# Patient Record
Sex: FEMALE | Race: WHITE | NOT HISPANIC OR LATINO | Employment: UNEMPLOYED | URBAN - METROPOLITAN AREA
[De-identification: names, ages, dates, MRNs, and addresses within clinical notes are randomized per-mention and may not be internally consistent; named-entity substitution may affect disease eponyms.]

---

## 2017-05-27 ENCOUNTER — APPOINTMENT (OUTPATIENT)
Dept: RADIOLOGY | Facility: MEDICAL CENTER | Age: 47
End: 2017-05-27
Attending: EMERGENCY MEDICINE
Payer: COMMERCIAL

## 2017-05-27 ENCOUNTER — HOSPITAL ENCOUNTER (EMERGENCY)
Facility: MEDICAL CENTER | Age: 47
End: 2017-05-27
Attending: EMERGENCY MEDICINE
Payer: COMMERCIAL

## 2017-05-27 VITALS
HEIGHT: 62 IN | OXYGEN SATURATION: 93 % | HEART RATE: 87 BPM | SYSTOLIC BLOOD PRESSURE: 108 MMHG | DIASTOLIC BLOOD PRESSURE: 75 MMHG | TEMPERATURE: 97.9 F | WEIGHT: 209.44 LBS | RESPIRATION RATE: 18 BRPM | BODY MASS INDEX: 38.54 KG/M2

## 2017-05-27 DIAGNOSIS — R10.84 GENERALIZED ABDOMINAL PAIN: ICD-10-CM

## 2017-05-27 DIAGNOSIS — G89.4 CHRONIC PAIN SYNDROME: ICD-10-CM

## 2017-05-27 LAB
ALBUMIN SERPL BCP-MCNC: 4.2 G/DL (ref 3.2–4.9)
ALBUMIN/GLOB SERPL: 1.2 G/DL
ALP SERPL-CCNC: 109 U/L (ref 30–99)
ALT SERPL-CCNC: 59 U/L (ref 2–50)
ANION GAP SERPL CALC-SCNC: 9 MMOL/L (ref 0–11.9)
APPEARANCE UR: ABNORMAL
AST SERPL-CCNC: 63 U/L (ref 12–45)
BACTERIA #/AREA URNS HPF: ABNORMAL /HPF
BASOPHILS # BLD AUTO: 0.7 % (ref 0–1.8)
BASOPHILS # BLD: 0.06 K/UL (ref 0–0.12)
BILIRUB SERPL-MCNC: 0.6 MG/DL (ref 0.1–1.5)
BILIRUB UR QL STRIP.AUTO: NEGATIVE
BUN SERPL-MCNC: 21 MG/DL (ref 8–22)
CALCIUM SERPL-MCNC: 9.7 MG/DL (ref 8.4–10.2)
CHLORIDE SERPL-SCNC: 102 MMOL/L (ref 96–112)
CO2 SERPL-SCNC: 25 MMOL/L (ref 20–33)
COLOR UR: YELLOW
CREAT SERPL-MCNC: 0.92 MG/DL (ref 0.5–1.4)
CULTURE IF INDICATED INDCX: YES UA CULTURE
EOSINOPHIL # BLD AUTO: 0.28 K/UL (ref 0–0.51)
EOSINOPHIL NFR BLD: 3.3 % (ref 0–6.9)
EPI CELLS #/AREA URNS HPF: ABNORMAL /HPF
ERYTHROCYTE [DISTWIDTH] IN BLOOD BY AUTOMATED COUNT: 47.5 FL (ref 35.9–50)
GFR SERPL CREATININE-BSD FRML MDRD: >60 ML/MIN/1.73 M 2
GLOBULIN SER CALC-MCNC: 3.5 G/DL (ref 1.9–3.5)
GLUCOSE SERPL-MCNC: 134 MG/DL (ref 65–99)
GLUCOSE UR STRIP.AUTO-MCNC: NEGATIVE MG/DL
HCG SERPL QL: NEGATIVE
HCT VFR BLD AUTO: 43.8 % (ref 37–47)
HGB BLD-MCNC: 14.1 G/DL (ref 12–16)
IMM GRANULOCYTES # BLD AUTO: 0.06 K/UL (ref 0–0.11)
IMM GRANULOCYTES NFR BLD AUTO: 0.7 % (ref 0–0.9)
KETONES UR STRIP.AUTO-MCNC: NEGATIVE MG/DL
LEUKOCYTE ESTERASE UR QL STRIP.AUTO: NEGATIVE
LIPASE SERPL-CCNC: 35 U/L (ref 7–58)
LYMPHOCYTES # BLD AUTO: 2.79 K/UL (ref 1–4.8)
LYMPHOCYTES NFR BLD: 33 % (ref 22–41)
MCH RBC QN AUTO: 29 PG (ref 27–33)
MCHC RBC AUTO-ENTMCNC: 32.2 G/DL (ref 33.6–35)
MCV RBC AUTO: 90.1 FL (ref 81.4–97.8)
MICRO URNS: ABNORMAL
MONOCYTES # BLD AUTO: 0.37 K/UL (ref 0–0.85)
MONOCYTES NFR BLD AUTO: 4.4 % (ref 0–13.4)
MUCOUS THREADS #/AREA URNS HPF: ABNORMAL /HPF
NEUTROPHILS # BLD AUTO: 4.89 K/UL (ref 2–7.15)
NEUTROPHILS NFR BLD: 57.9 % (ref 44–72)
NITRITE UR QL STRIP.AUTO: NEGATIVE
NRBC # BLD AUTO: 0 K/UL
NRBC BLD AUTO-RTO: 0 /100 WBC
PH UR STRIP.AUTO: 6 [PH]
PLATELET # BLD AUTO: 214 K/UL (ref 164–446)
PMV BLD AUTO: 9.4 FL (ref 9–12.9)
POTASSIUM SERPL-SCNC: 4.5 MMOL/L (ref 3.6–5.5)
PROT SERPL-MCNC: 7.7 G/DL (ref 6–8.2)
PROT UR QL STRIP: NEGATIVE MG/DL
RBC # BLD AUTO: 4.86 M/UL (ref 4.2–5.4)
RBC # URNS HPF: ABNORMAL /HPF
RBC UR QL AUTO: NEGATIVE
SODIUM SERPL-SCNC: 136 MMOL/L (ref 135–145)
SP GR UR STRIP.AUTO: 1.02
TROPONIN I SERPL-MCNC: <0.02 NG/ML (ref 0–0.04)
WBC # BLD AUTO: 8.5 K/UL (ref 4.8–10.8)
WBC #/AREA URNS HPF: ABNORMAL /HPF

## 2017-05-27 PROCEDURE — 76705 ECHO EXAM OF ABDOMEN: CPT

## 2017-05-27 PROCEDURE — 85025 COMPLETE CBC W/AUTO DIFF WBC: CPT

## 2017-05-27 PROCEDURE — 83690 ASSAY OF LIPASE: CPT

## 2017-05-27 PROCEDURE — 93005 ELECTROCARDIOGRAM TRACING: CPT | Performed by: EMERGENCY MEDICINE

## 2017-05-27 PROCEDURE — 96374 THER/PROPH/DIAG INJ IV PUSH: CPT

## 2017-05-27 PROCEDURE — 87086 URINE CULTURE/COLONY COUNT: CPT

## 2017-05-27 PROCEDURE — A9270 NON-COVERED ITEM OR SERVICE: HCPCS | Performed by: EMERGENCY MEDICINE

## 2017-05-27 PROCEDURE — 700111 HCHG RX REV CODE 636 W/ 250 OVERRIDE (IP): Performed by: EMERGENCY MEDICINE

## 2017-05-27 PROCEDURE — 36415 COLL VENOUS BLD VENIPUNCTURE: CPT

## 2017-05-27 PROCEDURE — 700105 HCHG RX REV CODE 258: Performed by: EMERGENCY MEDICINE

## 2017-05-27 PROCEDURE — 96376 TX/PRO/DX INJ SAME DRUG ADON: CPT

## 2017-05-27 PROCEDURE — 80053 COMPREHEN METABOLIC PANEL: CPT

## 2017-05-27 PROCEDURE — 84484 ASSAY OF TROPONIN QUANT: CPT

## 2017-05-27 PROCEDURE — 700117 HCHG RX CONTRAST REV CODE 255: Performed by: EMERGENCY MEDICINE

## 2017-05-27 PROCEDURE — 81001 URINALYSIS AUTO W/SCOPE: CPT

## 2017-05-27 PROCEDURE — 99285 EMERGENCY DEPT VISIT HI MDM: CPT

## 2017-05-27 PROCEDURE — 74177 CT ABD & PELVIS W/CONTRAST: CPT

## 2017-05-27 PROCEDURE — 700102 HCHG RX REV CODE 250 W/ 637 OVERRIDE(OP): Performed by: EMERGENCY MEDICINE

## 2017-05-27 PROCEDURE — 84703 CHORIONIC GONADOTROPIN ASSAY: CPT

## 2017-05-27 RX ORDER — SODIUM CHLORIDE 9 MG/ML
1000 INJECTION, SOLUTION INTRAVENOUS ONCE
Status: COMPLETED | OUTPATIENT
Start: 2017-05-27 | End: 2017-05-27

## 2017-05-27 RX ORDER — SERTRALINE HYDROCHLORIDE 25 MG/1
25 TABLET, FILM COATED ORAL EVERY EVENING
COMMUNITY
Start: 2018-04-29 | End: 2018-05-08

## 2017-05-27 RX ORDER — METOPROLOL TARTRATE 50 MG/1
50 TABLET, FILM COATED ORAL DAILY
COMMUNITY

## 2017-05-27 RX ORDER — ESCITALOPRAM OXALATE 10 MG/1
10 TABLET ORAL DAILY
COMMUNITY
Start: 2018-05-01 | End: 2018-05-08

## 2017-05-27 RX ORDER — SUCRALFATE 1 G/1
1 TABLET ORAL
Qty: 28 TAB | Refills: 0 | Status: SHIPPED | OUTPATIENT
Start: 2017-05-27 | End: 2017-06-03

## 2017-05-27 RX ORDER — OMEPRAZOLE 20 MG/1
20 CAPSULE, DELAYED RELEASE ORAL DAILY
Qty: 28 CAP | Refills: 0 | Status: SHIPPED | OUTPATIENT
Start: 2017-05-27 | End: 2017-06-10

## 2017-05-27 RX ORDER — DILTIAZEM HYDROCHLORIDE 120 MG/1
120 CAPSULE, COATED, EXTENDED RELEASE ORAL 2 TIMES DAILY
COMMUNITY

## 2017-05-27 RX ORDER — ATORVASTATIN CALCIUM 20 MG/1
20 TABLET, FILM COATED ORAL NIGHTLY
COMMUNITY

## 2017-05-27 RX ORDER — OXYCODONE HYDROCHLORIDE 10 MG/1
10 TABLET ORAL EVERY 4 HOURS PRN
COMMUNITY

## 2017-05-27 RX ORDER — OMEPRAZOLE 20 MG/1
20 CAPSULE, DELAYED RELEASE ORAL ONCE
Status: COMPLETED | OUTPATIENT
Start: 2017-05-27 | End: 2017-05-27

## 2017-05-27 RX ADMIN — IOHEXOL 100 ML: 350 INJECTION, SOLUTION INTRAVENOUS at 14:27

## 2017-05-27 RX ADMIN — HYDROMORPHONE HYDROCHLORIDE 0.5 MG: 1 INJECTION, SOLUTION INTRAMUSCULAR; INTRAVENOUS; SUBCUTANEOUS at 11:36

## 2017-05-27 RX ADMIN — LIDOCAINE HYDROCHLORIDE 30 ML: 20 SOLUTION OROPHARYNGEAL at 12:53

## 2017-05-27 RX ADMIN — SODIUM CHLORIDE 1000 ML: 9 INJECTION, SOLUTION INTRAVENOUS at 13:36

## 2017-05-27 RX ADMIN — HYDROMORPHONE HYDROCHLORIDE 0.5 MG: 1 INJECTION, SOLUTION INTRAMUSCULAR; INTRAVENOUS; SUBCUTANEOUS at 13:37

## 2017-05-27 RX ADMIN — OMEPRAZOLE 20 MG: 20 CAPSULE, DELAYED RELEASE ORAL at 15:24

## 2017-05-27 ASSESSMENT — PAIN SCALES - GENERAL: PAINLEVEL_OUTOF10: 8

## 2017-05-27 NOTE — ED AVS SNAPSHOT
Home Care Instructions                                                                                                                Marianne Miguel   MRN: [id number]    Department:  [facility], Emergency Dept   Date of Visit:  5/27/2017            [facility], Emergency Dept    [address]    [place] [address]    Phone:  [phone]      You were seen by     Michel Torres M.D.      Your Diagnosis Was     Generalized abdominal pain     R10.84       These are the medications you received during your hospitalization from 05/27/2017 0924 to 05/27/2017 1520     Date/Time Order Dose Route Action    05/27/2017 1136 HYDROmorphone (DILAUDID) injection 0.5 mg 0.5 mg Intravenous Given    05/27/2017 1253 hyoscyamine-maalox plus-lidocaine viscous (GI COCKTAIL) oral susp 30 mL 30 mL Oral Given    05/27/2017 1336 NS infusion 1,000 mL 1,000 mL Intravenous New Bag    05/27/2017 1337 HYDROmorphone (DILAUDID) injection 0.5 mg 0.5 mg Intravenous Given    05/27/2017 1427 iohexol (OMNIPAQUE) 350 mg/mL 100 mL Intravenous Given      Follow-up Information     1. Follow up with Becky Shen M.D..    Specialty:  Family Medicine    Contact information    [address]  [address]  [place] [id number] [phone]        Medication Information     Review all of your home medications and newly ordered medications with your primary doctor and/or pharmacist as soon as possible. Follow medication instructions as directed by your doctor and/or pharmacist.     Please keep your complete medication list with you and share with your physician. Update the information when medications are discontinued, doses are changed, or new medications (including over-the-counter products) are added; and carry medication information at all times in the event of emergency situations.               Medication List      START taking these medications        Instructions    Morning Afternoon Evening Bedtime    omeprazole 20 MG delayed-release capsule   Commonly known as:  PRILOSEC        Take 1 Cap by mouth every day for 14 days.   Dose:  20 mg                        sucralfate 1 GM Tabs   Commonly known as:  CARAFATE        Take 1 Tab by mouth 4 Times a Day,Before Meals and at Bedtime for 7 days.   Dose:  1 g                          ASK your doctor about these medications        Instructions    Morning Afternoon Evening Bedtime    atorvastatin 20 MG Tabs   Commonly known as:  LIPITOR        Take 20 mg by mouth every evening.   Dose:  20 mg                        conjugated estrogen 0.625 MG Tabs   Commonly known as:  PREMARIN        Take 0.625 mg by mouth every day.   Dose:  0.625 mg                        diltiazem  MG Cp24   Commonly known as:  CARDIZEM CD        Take 120 mg by mouth 2 Times a Day.   Dose:  120 mg                        escitalopram 10 MG Tabs   Commonly known as:  LEXAPRO        Take 10 mg by mouth every day.   Dose:  10 mg                        metoprolol 50 MG Tabs   Commonly known as:  LOPRESSOR        Take 50 mg by mouth every day.   Dose:  50 mg                        oxycodone immediate release 10 MG immediate release tablet   Commonly known as:  ROXICODONE        Take 10 mg by mouth every four hours as needed for Moderate Pain.   Dose:  10 mg                        sertraline 25 MG tablet   Commonly known as:  ZOLOFT        Take 25 mg by mouth every evening.   Dose:  25 mg                             Where to Get Your Medications      These medications were sent to [facility] [id number] [place] [address] - [facility] AT [facility]  [address] [facility] [address]     Phone:  [phone]    - omeprazole 20 MG delayed-release capsule  - sucralfate 1 GM Tabs            Procedures and tests performed during your visit     CBC WITH DIFFERENTIAL    COMP METABOLIC PANEL    CONSENT FOR CONTRAST INJECTION    CT-ABDOMEN-PELVIS WITH    EKG (NOW)    ESTIMATED GFR    HCG  QUAL SERUM    IV SALINE LOCK    LIPASE    TROPONIN    URINALYSIS CULTURE, IF INDICATED    URINE MICROSCOPIC (W/UA)    US-GALLBLADDER        Discharge Instructions       Abdominal Pain, Women  Abdominal (stomach, pelvic, or belly) pain can be caused by many things. It is important to tell your doctor:  · The location of the pain.  · Does it come and go or is it present all the time?  · Are there things that start the pain (eating certain foods, exercise)?  · Are there other symptoms associated with the pain (fever, nausea, vomiting, diarrhea)?  All of this is helpful to know when trying to find the cause of the pain.  CAUSES   · Stomach: virus or bacteria infection, or ulcer.  · Intestine: appendicitis (inflamed appendix), regional ileitis (Crohn's disease), ulcerative colitis (inflamed colon), irritable bowel syndrome, diverticulitis (inflamed diverticulum of the colon), or cancer of the stomach or intestine.  · Gallbladder disease or stones in the gallbladder.  · Kidney disease, kidney stones, or infection.  · Pancreas infection or cancer.  · Fibromyalgia (pain disorder).  · Diseases of the female organs:  · Uterus: fibroid (non-cancerous) tumors or infection.  · Fallopian tubes: infection or tubal pregnancy.  · Ovary: cysts or tumors.  · Pelvic adhesions (scar tissue).  · Endometriosis (uterus lining tissue growing in the pelvis and on the pelvic organs).  · Pelvic congestion syndrome (female organs filling up with blood just before the menstrual period).  · Pain with the menstrual period.  · Pain with ovulation (producing an egg).  · Pain with an IUD (intrauterine device, birth control) in the uterus.  · Cancer of the female organs.  · Functional pain (pain not caused by a disease, may improve without treatment).  · Psychological pain.  · Depression.  DIAGNOSIS   Your doctor will decide the seriousness of your pain by doing an examination.  · Blood tests.  · X-rays.  · Ultrasound.  · CT scan (computed tomography,  special type of X-ray).  · MRI (magnetic resonance imaging).  · Cultures, for infection.  · Barium enema (dye inserted in the large intestine, to better view it with X-rays).  · Colonoscopy (looking in intestine with a lighted tube).  · Laparoscopy (minor surgery, looking in abdomen with a lighted tube).  · Major abdominal exploratory surgery (looking in abdomen with a large incision).  TREATMENT   The treatment will depend on the cause of the pain.   · Many cases can be observed and treated at home.  · Over-the-counter medicines recommended by your caregiver.  · Prescription medicine.  · Antibiotics, for infection.  · Birth control pills, for painful periods or for ovulation pain.  · Hormone treatment, for endometriosis.  · Nerve blocking injections.  · Physical therapy.  · Antidepressants.  · Counseling with a psychologist or psychiatrist.  · Minor or major surgery.  HOME CARE INSTRUCTIONS   · Do not take laxatives, unless directed by your caregiver.  · Take over-the-counter pain medicine only if ordered by your caregiver. Do not take aspirin because it can cause an upset stomach or bleeding.  · Try a clear liquid diet (broth or water) as ordered by your caregiver. Slowly move to a bland diet, as tolerated, if the pain is related to the stomach or intestine.  · Have a thermometer and take your temperature several times a day, and record it.  · Bed rest and sleep, if it helps the pain.  · Avoid sexual intercourse, if it causes pain.  · Avoid stressful situations.  · Keep your follow-up appointments and tests, as your caregiver orders.  · If the pain does not go away with medicine or surgery, you may try:  · Acupuncture.  · Relaxation exercises (yoga, meditation).  · Group therapy.  · Counseling.  SEEK MEDICAL CARE IF:   · You notice certain foods cause stomach pain.  · Your home care treatment is not helping your pain.  · You need stronger pain medicine.  · You want your IUD removed.  · You feel faint or  lightheaded.  · You develop nausea and vomiting.  · You develop a rash.  · You are having side effects or an allergy to your medicine.  SEEK IMMEDIATE MEDICAL CARE IF:   · Your pain does not go away or gets worse.  · You have a fever.  · Your pain is felt only in portions of the abdomen. The right side could possibly be appendicitis. The left lower portion of the abdomen could be colitis or diverticulitis.  · You are passing blood in your stools (bright red or black tarry stools, with or without vomiting).  · You have blood in your urine.  · You develop chills, with or without a fever.  · You pass out.  MAKE SURE YOU:   · Understand these instructions.  · Will watch your condition.  · Will get help right away if you are not doing well or get worse.  Document Released: 10/14/2008 Document Revised: 03/11/2013 Document Reviewed: 11/04/2010  ExitCare® Patient Information ©2014 [employer].            Patient Information     Patient Information    Following emergency treatment: all patient requiring follow-up care must return either to a private physician or a clinic if your condition worsens before you are able to obtain further medical attention, please return to the emergency room.     Billing Information    At [facility], we work to make the billing process streamlined for our patients.  Our Representatives are here to answer any questions you may have regarding your hospital bill.  If you have insurance coverage and have supplied your insurance information to us, we will submit a claim to your insurer on your behalf.  Should you have any questions regarding your bill, we can be reached online or by phone as follows:  Online: You are able pay your bills online or live chat with our representatives about any billing questions you may have. We are here to help Monday - Friday from 8:00am to 7:30pm and 9:00am - 12:00pm on Saturdays.  Please visit [email or web address]  for more  information.   Phone:  [phone] or [phone]    Please note that your emergency physician, surgeon, pathologist, radiologist, anesthesiologist, and other specialists are not employed by [facility] and will therefore bill separately for their services.  Please contact them directly for any questions concerning their bills at the numbers below:     Emergency Physician Services:  [phone]  [facility]:  [phone]  [facility]:  [phone]  [facility]:  [phone]    1. Your final bill may vary from the amount quoted upon discharge if all procedures are not complete at that time, or if your doctor has additional procedures of which we are not aware. You will receive an additional bill if you return to the Emergency Department at [facility] for suture removal regardless of the facility of which the sutures were placed.     2. Please arrange for settlement of this account at the emergency registration.    3. All self-pay accounts are due in full at the time of treatment.  If you are unable to meet this obligation then payment is expected within 4-5 days.     4. If you have had radiology studies (CT, X-ray, Ultrasound, MRI), you have received a preliminary result during your emergency department visit. Please contact the radiology department [phone] to receive a copy of your final result. Please discuss the Final result with your primary physician or with the follow up physician provided.     Crisis Hotline:  [place] Crisis Hotline:  7-118-LZWCTRN or 1-693.347.4108  [place] Crisis Hotline:    1-841.658.2600 or 723-336-3520         ED Discharge Follow Up Questions    1. In order to provide you with very good care, we would like to follow up with a phone call in the next few days.  May we have your permission to contact you?     YES /  NO    2. What is the best phone number to call you? (       )_____-__________    3. What is the best time to call  you?      Morning  /  Afternoon  /  Evening                   Patient Signature:  ____________________________________________________________    Date:  ____________________________________________________________

## 2017-05-27 NOTE — ED NOTES
Discharged home in good condition with prescriptions and follow up instructions, pt verbalizes understanding of all, ambulates out with steady gait accompanied by .

## 2017-05-27 NOTE — ED AVS SNAPSHOT
Global Crossing Access Code: [email or web address]  Expires: 6/26/2017  3:20 PM    Your email address is not on file at [email or web address].  Email Addresses are required for you to sign up for Global Crossing, please contact [phone] to verify your personal information and to provide your email address prior to attempting to register for Global Crossing.    Marianne Miguel  [address] [place] [address]    [employer]  A secure, online tool to manage your health information     [email or web address]’s Global Crossing® is a secure, online tool that connects you to your personalized health information from the privacy of your home -- day or night - making it very easy for you to manage your healthcare. Once the activation process is completed, you can even access your medical information using the Global Crossing gabe, which is available for free in the Apple Gabe store or Google Play store.     To learn more about Global Crossing, visit [email or web address]    There are two levels of access available (as shown below):   My Chart Features  [facility] Primary Care Doctor [facility]  Specialists [facility]  Urgent  Care Non-[facility] Primary Care Doctor   Email your healthcare team securely and privately 24/7 X X X    Manage appointments: schedule your next appointment; view details of past/upcoming appointments X      Request prescription refills. X      View recent personal medical records, including lab and immunizations X X X X   View health record, including health history, allergies, medications X X X X   Read reports about your outpatient visits, procedures, consult and ER notes X X X X   See your discharge summary, which is a recap of your hospital and/or ER visit that includes your diagnosis, lab results, and care plan X X  X     How to register for Global Crossing:  Once your e-mail address has been verified, follow the following steps to sign up for Global Crossing.     1. Go to  [email or web address]  2. Click on the Sign Up Now box, which takes you to the New Member Sign Up page. You will  need to provide the following information:  a. Enter your [employer] Access Code exactly as it appears at the top of this page. (You will not need to use this code after you’ve completed the sign-up process. If you do not sign up before the expiration date, you must request a new code.)   b. Enter your date of birth.   c. Enter your home email address.   d. Click Submit, and follow the next screen’s instructions.  3. Create a [employer] ID. This will be your iCare Technology login ID and cannot be changed, so think of one that is secure and easy to remember.  4. Create a i[employer] Technology password. You can change your password at any time.  5. Enter your Password Reset Question and Answer. This can be used at a later time if you forget your password.   6. Enter your e-mail address. This allows you to receive e-mail notifications when new information is available in iCare Technology.  7. Click Sign Up. You can now view your health information.    For assistance activating your i[employer] Technology account, call [phone]

## 2017-05-27 NOTE — DISCHARGE INSTRUCTIONS
Abdominal Pain, Women  Abdominal (stomach, pelvic, or belly) pain can be caused by many things. It is important to tell your doctor:  · The location of the pain.  · Does it come and go or is it present all the time?  · Are there things that start the pain (eating certain foods, exercise)?  · Are there other symptoms associated with the pain (fever, nausea, vomiting, diarrhea)?  All of this is helpful to know when trying to find the cause of the pain.  CAUSES   · Stomach: virus or bacteria infection, or ulcer.  · Intestine: appendicitis (inflamed appendix), regional ileitis (Crohn's disease), ulcerative colitis (inflamed colon), irritable bowel syndrome, diverticulitis (inflamed diverticulum of the colon), or cancer of the stomach or intestine.  · Gallbladder disease or stones in the gallbladder.  · Kidney disease, kidney stones, or infection.  · Pancreas infection or cancer.  · Fibromyalgia (pain disorder).  · Diseases of the female organs:  · Uterus: fibroid (non-cancerous) tumors or infection.  · Fallopian tubes: infection or tubal pregnancy.  · Ovary: cysts or tumors.  · Pelvic adhesions (scar tissue).  · Endometriosis (uterus lining tissue growing in the pelvis and on the pelvic organs).  · Pelvic congestion syndrome (female organs filling up with blood just before the menstrual period).  · Pain with the menstrual period.  · Pain with ovulation (producing an egg).  · Pain with an IUD (intrauterine device, birth control) in the uterus.  · Cancer of the female organs.  · Functional pain (pain not caused by a disease, may improve without treatment).  · Psychological pain.  · Depression.  DIAGNOSIS   Your doctor will decide the seriousness of your pain by doing an examination.  · Blood tests.  · X-rays.  · Ultrasound.  · CT scan (computed tomography, special type of X-ray).  · MRI (magnetic resonance imaging).  · Cultures, for infection.  · Barium enema (dye inserted in the large intestine, to better view it with  X-rays).  · Colonoscopy (looking in intestine with a lighted tube).  · Laparoscopy (minor surgery, looking in abdomen with a lighted tube).  · Major abdominal exploratory surgery (looking in abdomen with a large incision).  TREATMENT   The treatment will depend on the cause of the pain.   · Many cases can be observed and treated at home.  · Over-the-counter medicines recommended by your caregiver.  · Prescription medicine.  · Antibiotics, for infection.  · Birth control pills, for painful periods or for ovulation pain.  · Hormone treatment, for endometriosis.  · Nerve blocking injections.  · Physical therapy.  · Antidepressants.  · Counseling with a psychologist or psychiatrist.  · Minor or major surgery.  HOME CARE INSTRUCTIONS   · Do not take laxatives, unless directed by your caregiver.  · Take over-the-counter pain medicine only if ordered by your caregiver. Do not take aspirin because it can cause an upset stomach or bleeding.  · Try a clear liquid diet (broth or water) as ordered by your caregiver. Slowly move to a bland diet, as tolerated, if the pain is related to the stomach or intestine.  · Have a thermometer and take your temperature several times a day, and record it.  · Bed rest and sleep, if it helps the pain.  · Avoid sexual intercourse, if it causes pain.  · Avoid stressful situations.  · Keep your follow-up appointments and tests, as your caregiver orders.  · If the pain does not go away with medicine or surgery, you may try:  · Acupuncture.  · Relaxation exercises (yoga, meditation).  · Group therapy.  · Counseling.  SEEK MEDICAL CARE IF:   · You notice certain foods cause stomach pain.  · Your home care treatment is not helping your pain.  · You need stronger pain medicine.  · You want your IUD removed.  · You feel faint or lightheaded.  · You develop nausea and vomiting.  · You develop a rash.  · You are having side effects or an allergy to your medicine.  SEEK IMMEDIATE MEDICAL CARE IF:   · Your  pain does not go away or gets worse.  · You have a fever.  · Your pain is felt only in portions of the abdomen. The right side could possibly be appendicitis. The left lower portion of the abdomen could be colitis or diverticulitis.  · You are passing blood in your stools (bright red or black tarry stools, with or without vomiting).  · You have blood in your urine.  · You develop chills, with or without a fever.  · You pass out.  MAKE SURE YOU:   · Understand these instructions.  · Will watch your condition.  · Will get help right away if you are not doing well or get worse.  Document Released: 10/14/2008 Document Revised: 03/11/2013 Document Reviewed: 11/04/2010  [employer]® Patient Information ©2014 [employer], [place] [employer].

## 2017-05-27 NOTE — ED NOTES
Epigastric pain, right jaw, right ear, and right arm pain. Worst pain is post prandial. Patient states was seen at [place] [facility] on 5/9 and has 6/16 consultation for gallbladder.

## 2017-05-27 NOTE — ED PROVIDER NOTES
ED Provider Note    CHIEF COMPLAINT  Chief Complaint   Patient presents with   • RUQ Pain   • Jaw Pain     right   • Ear Pain     right   • Arm Pain     right       HPI  Marianne Miguel is a 47 y.o. female who presents with abdominal pain. She has chronic abdominal pain it started after surgery she had bowel perforation she has chronic adhesions. She's been having this pain now for almost a month. It's everyday constant. She was seen at [facility] had a gallbladder ultrasound showed gallstones but no acute cholecystitis she has persistent pain. She is also had what sounds like bleeding ulcer in the past. She's had questionable melena. The pain is constant and nothing makes it any better is worse with eating. She has nausea no vomiting she has occasional dark stools. The pain does seem to radiate to the top right shoulder jaw and sometimes the ear and right arm. No shortness of breath. No leg pain or swelling. All other systems negative    REVIEW OF SYSTEMS  See HPI for further details    PAST MEDICAL HISTORY  Past Medical History   Diagnosis Date   • Hypertension        FAMILY HISTORY  No family history on file.    SOCIAL HISTORY  Social History     Social History   • Marital Status:      Spouse Name: N/A   • Number of Children: N/A   • Years of Education: N/A     Social History Main Topics   • Smoking status: Never Smoker    • Smokeless tobacco: Not on file   • Alcohol Use: No   • Drug Use: No   • Sexual Activity: Not on file     Other Topics Concern   • Not on file     Social History Narrative       SURGICAL HISTORY  Past Surgical History   Procedure Laterality Date   • Other abdominal surgery       bowel rupture 2013       CURRENT MEDICATIONS  Home Medications     Reviewed by Sarah Rabago ([facility]) on 05/27/17 at 1100  Med List Status: Complete    Medication Last Dose Status    atorvastatin (LIPITOR) 20 MG Tab 5/26/2017 Active    conjugated estrogen (PREMARIN) 0.625 MG Tab 5/27/2017  "Active    diltiazem CD (CARDIZEM CD) 120 MG CAPSULE SR 24 HR 5/27/2017 Active    escitalopram (LEXAPRO) 10 MG Tab 5/27/2017 Active    metoprolol (LOPRESSOR) 50 MG Tab 5/27/2017 Active    oxycodone immediate release (ROXICODONE) 10 MG immediate release tablet 5/27/2017 Active    sertraline (ZOLOFT) 25 MG tablet 5/26/2017 Active                ALLERGIES  Allergies   Allergen Reactions   • Augmentin Vomiting   • Bactrim      abd pain   • Flagyl [Metronidazole Hcl] Vomiting   • Morphine Itching     \"feels like I can't breath, but I can\"   • Sulfa Drugs Vomiting       PHYSICAL EXAM  VITAL SIGNS: /77 mmHg  Pulse 84  Temp(Src) 36.6 °C (97.9 °F)  Resp 16  Ht 1.575 m (5' 2.01\")  Wt 95 kg (209 lb 7 oz)  BMI 38.30 kg/m2  SpO2 93%    Constitutional: Patient is alert and oriented x3 in mild distress   HENT: Moist mucous membranes  Eyes: No conjunctivitis  Neck: Trachea is midline  Cardiovascular: Normal heart rate   Thorax & Lungs: Clear to auscultation  Abdomen: Obese abdomen diffuse tenderness no peritoneal findings guarding or rigidity  Neurologic: Normal motor sensation  Extremities: No edema  Psychiatric: Affect normal, Judgment normal, Mood normal.       EKG Interpretation    Interpreted by me    Rhythm: normal sinus   Rate: normal 84  Axis: normal  Ectopy: none  Conduction: normal  ST Segments: no acute change  T Waves: no acute change  Q Waves: none    Clinical Impression: no acute changes and normal EKG    Results for orders placed or performed during the hospital encounter of 05/27/17   CBC WITH DIFFERENTIAL   Result Value Ref Range    WBC 8.5 4.8 - 10.8 K/uL    RBC 4.86 4.20 - 5.40 M/uL    Hemoglobin 14.1 12.0 - 16.0 g/dL    Hematocrit 43.8 37.0 - 47.0 %    MCV 90.1 81.4 - 97.8 fL    MCH 29.0 27.0 - 33.0 pg    MCHC 32.2 (L) 33.6 - 35.0 g/dL    RDW 47.5 35.9 - 50.0 fL    Platelet Count 214 164 - 446 K/uL    MPV 9.4 9.0 - 12.9 fL    Neutrophils-Polys 57.90 44.00 - 72.00 %    Lymphocytes 33.00 22.00 - 41.00 " %    Monocytes 4.40 0.00 - 13.40 %    Eosinophils 3.30 0.00 - 6.90 %    Basophils 0.70 0.00 - 1.80 %    Immature Granulocytes 0.70 0.00 - 0.90 %    Nucleated RBC 0.00 /100 WBC    Neutrophils (Absolute) 4.89 2.00 - 7.15 K/uL    Lymphs (Absolute) 2.79 1.00 - 4.80 K/uL    Monos (Absolute) 0.37 0.00 - 0.85 K/uL    Eos (Absolute) 0.28 0.00 - 0.51 K/uL    Baso (Absolute) 0.06 0.00 - 0.12 K/uL    Immature Granulocytes (abs) 0.06 0.00 - 0.11 K/uL    NRBC (Absolute) 0.00 K/uL   COMP METABOLIC PANEL   Result Value Ref Range    Sodium 136 135 - 145 mmol/L    Potassium 4.5 3.6 - 5.5 mmol/L    Chloride 102 96 - 112 mmol/L    Co2 25 20 - 33 mmol/L    Anion Gap 9.0 0.0 - 11.9    Glucose 134 (H) 65 - 99 mg/dL    Bun 21 8 - 22 mg/dL    Creatinine 0.92 0.50 - 1.40 mg/dL    Calcium 9.7 8.4 - 10.2 mg/dL    AST(SGOT) 63 (H) 12 - 45 U/L    ALT(SGPT) 59 (H) 2 - 50 U/L    Alkaline Phosphatase 109 (H) 30 - 99 U/L    Total Bilirubin 0.6 0.1 - 1.5 mg/dL    Albumin 4.2 3.2 - 4.9 g/dL    Total Protein 7.7 6.0 - 8.2 g/dL    Globulin 3.5 1.9 - 3.5 g/dL    A-G Ratio 1.2 g/dL   LIPASE   Result Value Ref Range    Lipase 35 7 - 58 U/L   TROPONIN   Result Value Ref Range    Troponin I <0.02 0.00 - 0.04 ng/mL   URINALYSIS CULTURE, IF INDICATED   Result Value Ref Range    Micro Urine Req Microscopic     Color Yellow     Character Cloudy (A)     Specific Gravity 1.020 <1.035    Ph 6.0 5.0-8.0    Glucose Negative Negative mg/dL    Ketones Negative Negative mg/dL    Protein Negative Negative mg/dL    Bilirubin Negative Negative    Nitrite Negative Negative    Leukocyte Esterase Negative Negative    Occult Blood Negative Negative    Culture Indicated Yes UA Culture   HCG QUAL SERUM   Result Value Ref Range    Beta-Hcg Qualitative Serum Negative Negative   ESTIMATED GFR   Result Value Ref Range    GFR If African American >60 >60 mL/min/1.73 m 2    GFR If Non African American >60 >60 mL/min/1.73 m 2   URINE MICROSCOPIC (W/UA)   Result Value Ref Range    WBC  2-5 /hpf    RBC Rare /hpf    Bacteria Moderate (A) None /hpf    Epithelial Cells Moderate (A) Few /hpf    Mucous Threads Few /hpf   EKG (NOW)   Result Value Ref Range    Report       [facility]    Test Date:  2017  Pt Name:    CLEMENTINA ZARCO           Department: [facility]  MRN:        [id number]                      Room:       [place] [address]  Gender:     F                            Technician: [id number]  :        1970                   Requested By:MARGARET FOWLER  Order #:    [id number]                    Reading MD:    Measurements  Intervals                                Axis  Rate:       84                           P:          28  MA:         156                          QRS:        63  QRSD:       98                           T:          49  QT:         388  QTc:        459    Interpretive Statements  SINUS RHYTHM  No previous ECG available for comparison          CT-ABDOMEN-PELVIS WITH   Final Result      1.  There is no acute inflammatory process within the abdomen or pelvis.   2.  Hepatomegaly with diffuse hepatic fatty infiltration.   3.  Small dependent gallstones without biliary dilatation.   4.  Tiny fat-containing umbilical hernia is present.      US-GALLBLADDER   Final Result      1.  No evidence of gallstone or evidence of biliary ductal dilatation.      2.  The liver is echogenic consistent with fatty change versus hepatocellular dysfunction.            COURSE & MEDICAL DECISION MAKING  Pertinent Labs & Imaging studies reviewed. (See chart for details)  Patient has chronic abdominal pain. The radiation is concerning for possible cardiac. I will evaluate her EKG troponin to rule out MI also evaluate the gallbladder and give her pain medication and further abdominal pain evaluation. Time 11:35 AM    The patient requires 2nd dose of pain medication. Labs are normal except for mild elevation of liver function that's chronically elevated according the  patient. She had no gallstones seen on the gallbladder ultrasound. Initially I was suggesting not to do a CT there does not seem to be acuity there is chronicity to the pain and has normal lab work hour she states that she's had loculated seromas in the abdomen would like everything done so that CT was obtained which shows no acute abnormality although ironically it did show some gallstones.    The patient has chronic pain she is on chronic pain medication this could be etiologic in her pain it could be adhesions and it could be acid peptic disease among other entities. I will treat her with Prilosec Carafate 1st dose in the ER. I'm going to have her follow up with her physician surgeon in GI doctor and pain specialist    FINAL IMPRESSION  1.   1. Generalized abdominal pain    2. Chronic pain syndrome        2.   3.         Electronically signed by: Michel Torres, 5/27/2017 11:34 AM

## 2017-05-29 LAB
BACTERIA UR CULT: NORMAL
SIGNIFICANT IND 70042: NORMAL
SITE SITE: NORMAL
SOURCE SOURCE: NORMAL

## 2017-06-01 LAB — EKG IMPRESSION: NORMAL

## 2018-05-09 ENCOUNTER — SLEEP CENTER VISIT (OUTPATIENT)
Dept: SLEEP MEDICINE | Facility: MEDICAL CENTER | Age: 48
End: 2018-05-09
Payer: COMMERCIAL

## 2018-05-09 VITALS
RESPIRATION RATE: 16 BRPM | HEART RATE: 90 BPM | SYSTOLIC BLOOD PRESSURE: 112 MMHG | WEIGHT: 211 LBS | DIASTOLIC BLOOD PRESSURE: 62 MMHG | BODY MASS INDEX: 38.83 KG/M2 | HEIGHT: 62 IN | OXYGEN SATURATION: 91 %

## 2018-05-09 DIAGNOSIS — G89.4 CHRONIC PAIN SYNDROME: ICD-10-CM

## 2018-05-09 DIAGNOSIS — F11.90 NARCOTIC DRUG USE: ICD-10-CM

## 2018-05-09 DIAGNOSIS — G47.01 INSOMNIA DUE TO MEDICAL CONDITION: ICD-10-CM

## 2018-05-09 PROCEDURE — 99243 OFF/OP CNSLTJ NEW/EST LOW 30: CPT | Performed by: INTERNAL MEDICINE

## 2018-05-09 RX ORDER — ZOLPIDEM TARTRATE 5 MG/1
5 TABLET ORAL NIGHTLY PRN
Qty: 3 TAB | Refills: 0 | Status: SHIPPED | OUTPATIENT
Start: 2018-05-09 | End: 2018-05-10

## 2018-05-09 RX ORDER — QUETIAPINE FUMARATE 25 MG/1
TABLET, FILM COATED ORAL
Refills: 0 | COMMUNITY
Start: 2018-04-20

## 2018-05-09 NOTE — PROGRESS NOTES
"Chief Complaint   Patient presents with   • New Patient     sleep consultation       HPI: This patient is a 48 y.o. Female who is referred by Dr. Mulligan, Pain Specialist, for chronic insomnia.  She has a history of chronic abdominal pain following bowel rupture in 2013.  Uses oxycodone 10 mg QID.  She describes sleep onset insomnia for many years and was on Ambien for years which was discontinued on account of concerns of drug interaction with oxycodone.  She found Ambien beneficial and denies any adverse effects.  She has tried Lunesta, Sonata, Belsomra, trazodone, and Silenor without benefit. She currently uses quetiapine and Benadryl as sleep aids.  In terms of sleep habits, she goes to bed by 10 PM, taking quetiapine and 4 Benadryl+Zyquil at bedtime.  She will listen to music or play games on her phone.  She states she finally falls asleep around 1 AM.  Her  notes snoring and she experiences resuscitative snorts.  She has woken herself up from sleep feeling like she was drowning.  She gets up by 7:30 AM, feeling exhausted, and will return to bed for another 2 hours, ultimately gets up by 10 AM.  She feels \"too tired to do anything and depressed\".  She drinks principally caffeine free beverages and denies tobacco, alcohol or recreational drug use.  Her BMI is 38.  When she has been hospitalized, she was noted to desaturate during sleep.  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 Mulligan.      Past Medical History:   Diagnosis Date   • Bronchitis    • Depression    • Diabetes (HCC)    • GERD (gastroesophageal reflux disease)    • GI bleed    • Hypertension    • Nasal drainage    • Obesity    • Pneumonia        Social History     Social History   • Marital status:      Spouse name: N/A   • Number of children: N/A   • Years of education: N/A     Occupational History   • Not on file.     Social History Main Topics   • Smoking status: Never Smoker   • Smokeless tobacco: " "Never Used   • Alcohol use Yes      Comment: socially   • Drug use: No   • Sexual activity: Not on file     Other Topics Concern   • Not on file     Social History Narrative   • No narrative on file       Family History   Problem Relation Age of Onset   • Hypertension Mother        Current Outpatient Prescriptions on File Prior to Visit   Medication Sig Dispense Refill   • diltiazem CD (CARDIZEM CD) 120 MG CAPSULE SR 24 HR Take 120 mg by mouth 2 Times a Day.     • atorvastatin (LIPITOR) 20 MG Tab Take 20 mg by mouth every evening.     • conjugated estrogen (PREMARIN) 0.625 MG Tab Take 0.625 mg by mouth every day.     • oxycodone immediate release (ROXICODONE) 10 MG immediate release tablet Take 10 mg by mouth every four hours as needed for Moderate Pain.     • metoprolol (LOPRESSOR) 50 MG Tab Take 50 mg by mouth every day.       No current facility-administered medications on file prior to visit.        Allergies: Augmentin; Bactrim; Flagyl [metronidazole hcl]; Morphine; and Sulfa drugs    ROS:   Constitutional: Denies fevers, chills, night sweats, +fatigue, denies weight loss  Eyes: Denies vision loss, pain, drainage, double vision  Ears, Nose, Throat: Denies earache, difficulty hearing, tinnitus, nasal congestion, hoarseness  Cardiovascular: Denies chest pain, tightness, palpitations, orthopnea or edema  Respiratory: Denies shortness of breath, cough, wheezing, hemoptysis  Sleep: Denies daytime sleepiness, snoring, apneas, insomnia, morning headaches  GI: Denies heartburn, dysphagia, nausea, +abdominal pain, diarrhea or constipation  : Denies frequent urination, hematuria, discharge or painful urination  Musculoskeletal: denies back pain, +painful joints, sore muscles  Neurological: Denies weakness,+ headaches  Skin: No rashes    Blood pressure 112/62, pulse 90, resp. rate 16, height 1.575 m (5' 2\"), weight 95.7 kg (211 lb), SpO2 91 %.    Physical Exam:  Appearance: Well-nourished, well-developed, in no acute " distress  HEENT: Normocephalic, atraumatic, white sclera, PERRLA, oropharynx clear, Mallampati 3  Neck: No adenopathy or masses  Respiratory: no intercostal retractions or accessory muscle use  Lungs auscultation: Clear to auscultation bilaterally  Cardiovascular: Regular rate rhythm. No murmurs, rubs or gallops.  No LE edema  Abdomen: soft, nondistended  Gait: Normal  Digits: No clubbing, cyanosis  Motor: No focal deficits  Orientation: Oriented to time, person and place    Diagnosis:  1. Insomnia due to medical condition  POLYSOMNOGRAPHY, 4 OR MORE    zolpidem (AMBIEN) 5 MG Tab    suspect sleep-disordered breathing   2. BMI 38.0-38.9,adult     3. Narcotic drug use     4. Chronic pain syndrome         Plan:  The patient has sleep onset insomnia for many years, with high clinical suspicion for both central and obstructive sleep apnea.  Her elevated BMI and crowded airway predispose her to SHANTI, and her chronic narcotic use increases her risk for central sleep apnea.  Her insomnia is likely secondary to her sleep disordered breathing and treatment with airway pressurization would be expected to improve her sleep quality and fatigue.  She is currently over sedating herself with Benadryl and maintaining poor sleep hygiene.  She does not meet criteria for 24-hour sleep-wake disorder, a circadian rhythm sleep disorder which is principally seen in the blind.  We will arrange for polysomnography in the sleep laboratory for evaluation and treatment.  Good sleep hygiene encouraged.  Wean off of sleep aids once her sleep disordered breathing has been appropriately treated.  Return for after sleep study.

## 2018-05-14 ENCOUNTER — TELEPHONE (OUTPATIENT)
Dept: SLEEP MEDICINE | Facility: MEDICAL CENTER | Age: 48
End: 2018-05-14

## 2018-05-14 DIAGNOSIS — G47.33 OSA (OBSTRUCTIVE SLEEP APNEA): ICD-10-CM

## 2018-05-14 NOTE — TELEPHONE ENCOUNTER
CPT 82942 DENIED FOR IN LAB STUDY DUE TO LACK OF COMORBID CONDITIONS - COPD, SEVERE HEART FAILURE, NEUROMUSCULAR DISORDER OR BMI OF 50 OR GREATER.    PEER TO PEER [phone] WITHIN 21 DAYS FROM 05/10/18  INTAKE ID#[id number]    HST AVAILABLE WITHOUT AUTH

## 2018-05-30 ENCOUNTER — APPOINTMENT (OUTPATIENT)
Dept: SLEEP MEDICINE | Facility: MEDICAL CENTER | Age: 48
End: 2018-05-30
Payer: COMMERCIAL
